# Patient Record
Sex: FEMALE | Race: WHITE | NOT HISPANIC OR LATINO | Employment: FULL TIME | URBAN - METROPOLITAN AREA
[De-identification: names, ages, dates, MRNs, and addresses within clinical notes are randomized per-mention and may not be internally consistent; named-entity substitution may affect disease eponyms.]

---

## 2020-05-06 DIAGNOSIS — I10 HYPERTENSION: ICD-10-CM

## 2020-05-06 DIAGNOSIS — R06.02 SOB (SHORTNESS OF BREATH): ICD-10-CM

## 2020-05-06 DIAGNOSIS — R12 HEARTBURN: Primary | ICD-10-CM

## 2020-05-06 DIAGNOSIS — K21.00 REFLUX ESOPHAGITIS: ICD-10-CM

## 2020-05-06 DIAGNOSIS — M54.9 BACK PAIN: ICD-10-CM

## 2020-05-06 DIAGNOSIS — R07.9 CHEST PAIN: ICD-10-CM

## 2020-05-06 DIAGNOSIS — R53.83 FATIGUE: ICD-10-CM

## 2020-05-06 DIAGNOSIS — I34.1 MITRAL VALVE PROLAPSE: ICD-10-CM

## 2020-05-11 ENCOUNTER — LAB VISIT (OUTPATIENT)
Dept: LAB | Facility: HOSPITAL | Age: 55
End: 2020-05-11
Attending: SURGERY
Payer: COMMERCIAL

## 2020-05-11 ENCOUNTER — HOSPITAL ENCOUNTER (OUTPATIENT)
Dept: CARDIOLOGY | Facility: HOSPITAL | Age: 55
Discharge: HOME | End: 2020-05-11
Attending: SURGERY
Payer: COMMERCIAL

## 2020-05-11 ENCOUNTER — HOSPITAL ENCOUNTER (OUTPATIENT)
Dept: RADIOLOGY | Facility: HOSPITAL | Age: 55
Discharge: HOME | End: 2020-05-11
Attending: SURGERY
Payer: COMMERCIAL

## 2020-05-11 DIAGNOSIS — K21.00 REFLUX ESOPHAGITIS: ICD-10-CM

## 2020-05-11 DIAGNOSIS — R12 HEARTBURN: ICD-10-CM

## 2020-05-11 DIAGNOSIS — I10 HYPERTENSION: ICD-10-CM

## 2020-05-11 DIAGNOSIS — R06.02 SOB (SHORTNESS OF BREATH): Primary | ICD-10-CM

## 2020-05-11 DIAGNOSIS — I34.1 MITRAL VALVE PROLAPSE: ICD-10-CM

## 2020-05-11 DIAGNOSIS — M54.9 BACK PAIN: ICD-10-CM

## 2020-05-11 DIAGNOSIS — R53.83 FATIGUE: ICD-10-CM

## 2020-05-11 DIAGNOSIS — R07.9 CHEST PAIN: ICD-10-CM

## 2020-05-11 DIAGNOSIS — R06.02 SOB (SHORTNESS OF BREATH): ICD-10-CM

## 2020-05-11 LAB
ALBUMIN SERPL BCP-MCNC: 4.2 G/DL (ref 3.5–5.2)
ALP SERPL-CCNC: 69 U/L (ref 55–135)
ALT SERPL W/O P-5'-P-CCNC: 31 U/L (ref 10–44)
ANION GAP SERPL CALC-SCNC: 11 MMOL/L (ref 8–16)
AST SERPL-CCNC: 32 U/L (ref 10–40)
BILIRUB SERPL-MCNC: 0.4 MG/DL (ref 0.1–1)
BUN SERPL-MCNC: 14 MG/DL (ref 6–20)
CALCIUM SERPL-MCNC: 8.9 MG/DL (ref 8.7–10.5)
CHLORIDE SERPL-SCNC: 98 MMOL/L (ref 95–110)
CO2 SERPL-SCNC: 23 MMOL/L (ref 23–29)
CREAT SERPL-MCNC: 0.6 MG/DL (ref 0.5–1.4)
ERYTHROCYTE [DISTWIDTH] IN BLOOD BY AUTOMATED COUNT: 12.8 % (ref 11.5–14.5)
EST. GFR  (AFRICAN AMERICAN): >60 ML/MIN/1.73 M^2
EST. GFR  (NON AFRICAN AMERICAN): >60 ML/MIN/1.73 M^2
GLUCOSE SERPL-MCNC: 97 MG/DL (ref 70–110)
HCT VFR BLD AUTO: 44.5 % (ref 37–48.5)
HGB BLD-MCNC: 14.5 G/DL (ref 12–16)
MCH RBC QN AUTO: 31 PG (ref 27–31)
MCHC RBC AUTO-ENTMCNC: 32.6 G/DL (ref 32–36)
MCV RBC AUTO: 95 FL (ref 82–98)
PLATELET # BLD AUTO: 253 K/UL (ref 150–350)
PMV BLD AUTO: 11.1 FL (ref 9.2–12.9)
POTASSIUM SERPL-SCNC: 3.5 MMOL/L (ref 3.5–5.1)
PROT SERPL-MCNC: 8.2 G/DL (ref 6–8.4)
RBC # BLD AUTO: 4.68 M/UL (ref 4–5.4)
SODIUM SERPL-SCNC: 132 MMOL/L (ref 136–145)
TSH SERPL DL<=0.005 MIU/L-ACNC: 1.99 UIU/ML (ref 0.34–5.6)
WBC # BLD AUTO: 9.57 K/UL (ref 3.9–12.7)

## 2020-05-11 PROCEDURE — A9698 NON-RAD CONTRAST MATERIALNOC: HCPCS

## 2020-05-11 PROCEDURE — 93005 ELECTROCARDIOGRAM TRACING: CPT

## 2020-05-11 PROCEDURE — 84443 ASSAY THYROID STIM HORMONE: CPT

## 2020-05-11 PROCEDURE — 71046 XR CHEST PA AND LATERAL: ICD-10-PCS | Mod: 26,,, | Performed by: RADIOLOGY

## 2020-05-11 PROCEDURE — 74240 FL UPPER GI: ICD-10-PCS | Mod: 26,,, | Performed by: RADIOLOGY

## 2020-05-11 PROCEDURE — 74240 X-RAY XM UPR GI TRC 1CNTRST: CPT | Mod: 26,,, | Performed by: RADIOLOGY

## 2020-05-11 PROCEDURE — 25500020 PHARM REV CODE 255

## 2020-05-11 PROCEDURE — 74240 X-RAY XM UPR GI TRC 1CNTRST: CPT | Mod: TC,FY

## 2020-05-11 PROCEDURE — 36415 COLL VENOUS BLD VENIPUNCTURE: CPT

## 2020-05-11 PROCEDURE — 71046 X-RAY EXAM CHEST 2 VIEWS: CPT | Mod: TC,FY

## 2020-05-11 PROCEDURE — 71046 X-RAY EXAM CHEST 2 VIEWS: CPT | Mod: 26,,, | Performed by: RADIOLOGY

## 2020-05-11 PROCEDURE — 80053 COMPREHEN METABOLIC PANEL: CPT

## 2020-05-11 PROCEDURE — 85027 COMPLETE CBC AUTOMATED: CPT

## 2020-05-11 RX ADMIN — BARIUM SULFATE 355 ML: 0.6 SUSPENSION ORAL at 12:05

## 2020-05-11 RX ADMIN — BARIUM SULFATE 140 ML: 980 POWDER, FOR SUSPENSION ORAL at 12:05

## 2020-05-28 DIAGNOSIS — R93.89 ABNORMAL CXR: ICD-10-CM

## 2020-05-28 DIAGNOSIS — E66.01 MORBID OBESITY: ICD-10-CM

## 2020-05-28 DIAGNOSIS — R91.1 PULMONARY NODULE: ICD-10-CM

## 2020-05-28 DIAGNOSIS — K76.0 FATTY LIVER: Primary | ICD-10-CM

## 2020-06-04 ENCOUNTER — HOSPITAL ENCOUNTER (OUTPATIENT)
Dept: RADIOLOGY | Facility: HOSPITAL | Age: 55
Discharge: HOME | End: 2020-06-04
Attending: SURGERY
Payer: COMMERCIAL

## 2020-06-04 DIAGNOSIS — R93.89 ABNORMAL CXR: ICD-10-CM

## 2020-06-04 DIAGNOSIS — E66.01 MORBID OBESITY: ICD-10-CM

## 2020-06-04 DIAGNOSIS — K76.0 FATTY LIVER: ICD-10-CM

## 2020-06-04 DIAGNOSIS — R91.1 PULMONARY NODULE: ICD-10-CM

## 2020-06-04 PROCEDURE — 71260 CT THORAX DX C+: CPT | Mod: 26,,, | Performed by: RADIOLOGY

## 2020-06-04 PROCEDURE — 76705 US ABDOMEN LIMITED: ICD-10-PCS | Mod: 26,,, | Performed by: RADIOLOGY

## 2020-06-04 PROCEDURE — 71260 CT CHEST WITH CONTRAST: ICD-10-PCS | Mod: 26,,, | Performed by: RADIOLOGY

## 2020-06-04 PROCEDURE — 71260 CT THORAX DX C+: CPT | Mod: TC

## 2020-06-04 PROCEDURE — 25500020 PHARM REV CODE 255

## 2020-06-04 PROCEDURE — 76705 ECHO EXAM OF ABDOMEN: CPT | Mod: 26,,, | Performed by: RADIOLOGY

## 2020-06-04 PROCEDURE — 76705 ECHO EXAM OF ABDOMEN: CPT | Mod: TC

## 2020-06-04 RX ADMIN — IOHEXOL 75 ML: 350 INJECTION, SOLUTION INTRAVENOUS at 09:06

## 2020-06-24 ENCOUNTER — OFFICE VISIT (OUTPATIENT)
Dept: PULMONOLOGY | Facility: CLINIC | Age: 55
End: 2020-06-24
Payer: COMMERCIAL

## 2020-06-24 VITALS
SYSTOLIC BLOOD PRESSURE: 138 MMHG | BODY MASS INDEX: 39.99 KG/M2 | WEIGHT: 240 LBS | DIASTOLIC BLOOD PRESSURE: 101 MMHG | HEIGHT: 65 IN | OXYGEN SATURATION: 95 % | HEART RATE: 88 BPM | TEMPERATURE: 97 F

## 2020-06-24 DIAGNOSIS — K76.0 HEPATIC STEATOSIS: ICD-10-CM

## 2020-06-24 DIAGNOSIS — R91.8 PULMONARY NODULES: ICD-10-CM

## 2020-06-24 DIAGNOSIS — I72.8 SPLENIC ARTERY ANEURYSM: Primary | ICD-10-CM

## 2020-06-24 PROCEDURE — 99204 PR OFFICE/OUTPT VISIT, NEW, LEVL IV, 45-59 MIN: ICD-10-PCS | Mod: S$GLB,,, | Performed by: INTERNAL MEDICINE

## 2020-06-24 PROCEDURE — 99204 OFFICE O/P NEW MOD 45 MIN: CPT | Mod: S$GLB,,, | Performed by: INTERNAL MEDICINE

## 2020-06-24 RX ORDER — LISINOPRIL AND HYDROCHLOROTHIAZIDE 10; 12.5 MG/1; MG/1
1 TABLET ORAL DAILY
COMMUNITY

## 2020-06-24 RX ORDER — ROPINIROLE 1 MG/1
1 TABLET, FILM COATED ORAL DAILY
COMMUNITY

## 2020-06-24 RX ORDER — VENLAFAXINE HYDROCHLORIDE 37.5 MG/1
37.5 CAPSULE, EXTENDED RELEASE ORAL DAILY
COMMUNITY

## 2020-06-24 NOTE — PROGRESS NOTES
SUBJECTIVE:    Patient ID: Mary Fuller is a 54 y.o. female.    Chief Complaint: Establish Care and Pulmonary Nodules    HPI the patient was preoping for a gastric sleeve and had a CXR. This showed a small nodule in the [place].  CT shows that is a calcified granuloma and the patient also has 2 tiny nodules in the RUL.  She is low risk and therefore needs no further follow up of the nodules.  She does have severe hepatic steatosis.  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  The patient states her insurance company will not pay for her gastric sleeve.  Past Medical History:   Diagnosis Date    Fatty liver     Hypertension     Morbid obesity due to excess calories     Pulmonary nodule      Past Surgical History:   Procedure Laterality Date    bunyon surg  1996    CHOLECYSTECTOMY  1992    HYSTERECTOMY  2000     Family History   Problem Relation Age of Onset    Cancer Mother     Diabetes Mother     Hypertension Mother     Atrial fibrillation Mother     Hypertension Brother         Social History:   Marital Status: Legally   Occupation: document retriveal specialist  Alcohol History:  reports current alcohol use.  Tobacco History:  reports that she has never smoked. She has never used smokeless tobacco.  Drug History:  reports no history of drug use.    Review of patient's allergies indicates:   Allergen Reactions    Phenergan [promethazine] Other (See Comments)     Extremely agitated     Erythromycin Nausea And Vomiting       Current Outpatient Medications   Medication Sig Dispense Refill    lisinopriL-hydrochlorothiazide (PRINZIDE,ZESTORETIC) 10-12.5 mg per tablet Take 1 tablet by mouth once daily.      rOPINIRole (REQUIP) 1 MG tablet Take 1 mg by mouth once daily.      venlafaxine (EFFEXOR-XR) 37.5 MG 24 hr capsule Take 37.5 mg by mouth once daily.       No current facility-administered medications for this visit.        Alpha-1 Antitrypsin:  Last PFT:   Last  "CT:  06/04/2020  1. Granulomatous change.  Specifically, left upper lobe calcified granuloma accounts for the patient's recent radiographic finding.  2. Small soft tissue pulmonary nodules of the right lung.  Follow-up per Fleischner guidelines.  For multiple solid nodules all <6 mm, Fleischner Society 2017 guidelines recommend no routine follow up for a low risk patient, or follow up with non-contrast chest CT at 12 months after discovery in a high risk patient.  3. Hepatic steatosis.  4. Partially calcified aneurysm of the distal splenic artery.  Further evaluation with CTA of the abdomen as deemed clinically necessary.        Review of Systems  General: Feeling Well.  Eyes: Vision is good with contacts.  ENT:  R ear ruptured a year ago and hasn't healed yet  Heart:: No chest pain or palpitations.  Lungs: No cough, sputum, or wheezing.  GI: No Nausea, vomiting, constipation, diarrhea, or reflux.  : No dysuria, hesitancy, or nocturia.  Musculoskeletal: No joint pain or myalgias.  Skin: No lesions or rashes.  Neuro: No headaches or neuropathy.  Lymph: No edema or adenopathy.  Psych: No anxiety or depression.  Endo:lost 4 pounds in 2 weeks    OBJECTIVE:      BP (!) 138/101 (BP Location: Left arm, Patient Position: Sitting, BP Method: Large (Automatic))   Pulse 88   Temp 97.2 °F (36.2 °C)   Ht 5' 4.5" (1.638 m)   Wt 108.9 kg (240 lb)   SpO2 95% Comment: pt has fake nails on  BMI 40.56 kg/m²     Physical Exam  GENERAL:  Mid aged obese patient in no distress.  HEENT: Pupils equal and reactive. Extraocular movements intact. Nose intact.      Pharynx moist.  NECK: Supple.   HEART: Regular rate and rhythm. No murmur or gallop auscultated.  LUNGS: Clear to auscultation and percussion. Lung excursion symmetrical. No change in fremitus. No adventitial noises.  ABDOMEN: Bowel sounds present. Non-tender, no masses palpated.  EXTREMITIES: Normal muscle tone and joint movement, no cyanosis or clubbing.   LYMPHATICS: No " adenopathy palpated, no edema.  SKIN: Dry, intact, no lesions.   NEURO: Cranial nerves II-XII intact. Motor strength 5/5 bilaterally, upper and lower extremities.  PSYCH: Appropriate affect.    Assessment:       1. Splenic artery aneurysm    2. Pulmonary nodules    3. Hepatic steatosis        The calcified granuloma is obviously not a problem.  The 2 tiny pulmonary nodules are too small to worry about in this low risk patient.  Therefore, the patient does not need any further follow-up of these pulmonary nodules.  She has been counseled on the need for weight loss with her hepatic steatosis.  She will be referred to vascular surgery for her splenic artery aneurysm.  Plan:       Splenic artery aneurysm  -     Ambulatory referral/consult to Vascular Surgery; Future; Expected date: 07/01/2020    Pulmonary nodules    Hepatic steatosis         Follow up if symptoms worsen or fail to improve.

## 2020-07-01 DIAGNOSIS — I72.8 SPLENIC ARTERY ANEURYSM: Primary | ICD-10-CM

## 2020-08-28 ENCOUNTER — HOSPITAL ENCOUNTER (EMERGENCY)
Facility: HOSPITAL | Age: 55
Discharge: HOME | End: 2020-08-28
Attending: EMERGENCY MEDICINE
Payer: COMMERCIAL

## 2020-08-28 VITALS
RESPIRATION RATE: 18 BRPM | TEMPERATURE: 98 F | DIASTOLIC BLOOD PRESSURE: 91 MMHG | HEART RATE: 92 BPM | OXYGEN SATURATION: 95 % | SYSTOLIC BLOOD PRESSURE: 132 MMHG | BODY MASS INDEX: 39.32 KG/M2 | WEIGHT: 236 LBS | HEIGHT: 65 IN

## 2020-08-28 DIAGNOSIS — M54.31 SCIATICA OF RIGHT SIDE: Primary | ICD-10-CM

## 2020-08-28 PROCEDURE — 99284 EMERGENCY DEPT VISIT MOD MDM: CPT

## 2020-08-28 RX ORDER — METHYLPREDNISOLONE 4 MG/1
TABLET ORAL
Qty: 1 PACKAGE | Refills: 0 | Status: SHIPPED | OUTPATIENT
Start: 2020-08-28 | End: 2020-09-18

## 2020-08-28 RX ORDER — KETOROLAC TROMETHAMINE 10 MG/1
10 TABLET, FILM COATED ORAL EVERY 6 HOURS PRN
Qty: 20 TABLET | Refills: 0 | Status: SHIPPED | OUTPATIENT
Start: 2020-08-28

## 2020-08-28 RX ORDER — METHOCARBAMOL 500 MG/1
1000 TABLET, FILM COATED ORAL 3 TIMES DAILY PRN
Qty: 20 TABLET | Refills: 0 | Status: SHIPPED | OUTPATIENT
Start: 2020-08-28

## 2020-08-28 NOTE — DISCHARGE INSTRUCTIONS
Take all medications as prescribed.      Download the [facility] shante to access your health records & test results.  Please remember that you had a visit to the emergency room today and this does not substitute as primary care services for ongoing management because emergency services is a snap shot in time.  Should you have any worsening condition that requires emergency services do not hesitate to return to the ER.    COVID-19 TESTING  Hot Line [phone]  [address] [place] [address], [place] [id number]  [facility]  Hours: 8am-5pm Monday - Friday   8am-noon Saturday - Sunday